# Patient Record
Sex: MALE | Race: WHITE
[De-identification: names, ages, dates, MRNs, and addresses within clinical notes are randomized per-mention and may not be internally consistent; named-entity substitution may affect disease eponyms.]

---

## 2017-01-27 ENCOUNTER — HOSPITAL ENCOUNTER (EMERGENCY)
Dept: HOSPITAL 74 - JERFT | Age: 47
Discharge: HOME | End: 2017-01-27
Payer: COMMERCIAL

## 2017-01-27 VITALS — SYSTOLIC BLOOD PRESSURE: 147 MMHG | HEART RATE: 87 BPM | DIASTOLIC BLOOD PRESSURE: 90 MMHG | TEMPERATURE: 97.9 F

## 2017-01-27 VITALS — BODY MASS INDEX: 24.5 KG/M2

## 2017-01-27 DIAGNOSIS — M54.41: Primary | ICD-10-CM

## 2017-01-27 DIAGNOSIS — Z72.0: ICD-10-CM

## 2017-01-27 PROCEDURE — 3E0233Z INTRODUCTION OF ANTI-INFLAMMATORY INTO MUSCLE, PERCUTANEOUS APPROACH: ICD-10-PCS | Performed by: NURSE PRACTITIONER

## 2017-01-27 NOTE — PDOC
History of Present Illness





- General


Chief Complaint: Pain


Stated Complaint: Rt leg pain


Time Seen by Provider: 01/27/17 16:39


History Source: Patient


Exam Limitations: No Limitations





- History of Present Illness


Initial Comments: 





01/27/17 17:24


47 y/o male presents with continual low back pain but is relieved with Flexeril 

and Percocet but now has complaints of right buttock pain that is shocklike 

down his leg causing tingling intermittently for the past 2 days. Patient 

states works as a  that is frequently lifting and pulling items 

which is  attributed the symptoms to. Patient states did see his PCP a week 

ago who gave him Percocet and Flexeril with since pain continued and now has 

gone to his right leg he decided come to the ER. Patient denies previous injury 

to the affected area, patient denies saddle anesthesia, incontinence, weakness 

of the lower extremity, skin discoloration, or sensory changes.


Occurred: reports: other (3 weeks)


Severity: reports: moderate


Pain Location: reports: back, lower extremity (right leg)


Method of Injury: Yes: other (lifting and pulling)


Associated Symptoms (Fall): denies symptoms





Past History





- Past Medical History


Allergies/Adverse Reactions: 


 Allergies











Allergy/AdvReac Type Severity Reaction Status Date / Time


 


No Known Allergies Allergy   Verified 01/27/17 16:07











Home Medications: 


Ambulatory Orders





Cyclobenzaprine HCl [Flexeril -] 10 mg PO TID 01/27/17 


Oxycodone HCl/Acetaminophen [Percocet 5-325 mg Tablet] 1 tab PO Q4H 01/27/17 








Suicide Attempt (Hx): No





- Psycho/Social/Smoking Cessation Hx


Anxiety: No


Suicidal Ideation: No


Smoking History: Never smoked


Number of Cigarettes Smoked Daily: 2


Information on smoking cessation initiated: No


Hx Alcohol Use: No


Drug/Substance Use Hx: No


Substance Use Type: None


Patient Lives Alone: No


Lives with/in: spouse/SO





**Review of Systems





- Review of Systems


Able to Perform ROS?: Yes


Constitutional: No: Symptoms Reported


HEENTM: No: Symptoms Reported


Respiratory: No: Symptoms reported


Cardiac (ROS): No: Symptoms Reported


ABD/GI: No: Symptoms Reported


: No: Symptoms Reported


Musculoskeletal: Yes: Back Pain (lumbar), Muscle Pain (right buttock)


Integumentary: No: Symptoms Reported


Neurological: No: Symptoms reported


Endocrine: No: Symptoms Reported


Hematologic/Lymphatic: No: Symptoms Reported





*Physical Exam





- Vital Signs


 Last Vital Signs











Temp Pulse Resp BP Pulse Ox


 


 97.9 F   87   18   147/90   99 


 


 01/27/17 16:05  01/27/17 16:05  01/27/17 16:05  01/27/17 16:05  01/27/17 16:05














- Physical Exam


General Appearance: Yes: Nourished, Appropriately Dressed.  No: Apparent 

Distress


HEENT: negative: Pale Conjunctivae


Neck: positive: Supple.  negative: Tender


Respiratory/Chest: positive: Lungs Clear, Normal Breath Sounds.  negative: 

Chest Tender, Respiratory Distress, Accessory Muscle Use


Musculoskeletal: positive: Vertebral Tenderness (l4-L5 bilateral paraspinous 

muscles no midline tenderness), Other (right sciatic tenderness)


Extremity: positive: Normal Capillary Refill, Normal Inspection, Normal Range 

of Motion.  negative: Tender


Integumentary: positive: Normal Color, Warm, Moist


Neurologic: positive: Motor Strength 5/5 (ambulatory)





ED Treatment Course





- RADIOLOGY


Radiology Studies Ordered: 














 Category Date Time Status


 


 SPINE-LUMBAR ONLY [RAD] Stat Radiology  01/27/17 16:39 Taken














- Medications


Given in the ED: 


ED Medications














Discontinued Medications














Generic Name Dose Route Start Last Admin





  Trade Name Freq  PRN Reason Stop Dose Admin


 


Ketorolac Tromethamine  60 mg 01/27/17 16:39 01/27/17 16:51





  Toradol Injection -  IM 01/27/17 16:40  60 mg





  ONCE ONE   Administration














Medical Decision Making





- Medical Decision Making


01/27/17 17:01


Pt with continual low back pain and right buttock pain. Patient was given 

Flexeril and Percocet by his private care doctor which she states does 

alleviate the back pain but the sciatic pain continues. Patient states has had 

no imaging and states the pain is worsened with sitting. Patient on exam had no 

central tenderness to the vertebral column patient was ordered for lumbar x-ray 

and a injection of Toradol








01/27/17 17:24


xray shows mild degenerative arthritis.


States feeling better after Toradol. 


Patient will be given referral to orthopedist. 





*DC/Admit/Observation/Transfer


Diagnosis at time of Disposition: 


 Lumbar pain with radiation down right leg





- Discharge Dispostion


Disposition: HOME


Condition at time of disposition: Good





- Referrals


Referrals: 


Bill Biswas [Primary Care Provider] - 


Edmond Hrady MD [Staff Physician] - 





- Patient Instructions


Printed Discharge Instructions:  DI for Low Back Pain


Additional Instructions: 


Please take medication as previously prescribed.


Rest.


Avoid movments that trigger discomfort.


Xray shows arthritis of the spine.


You try taking Motrin for pain instead of the percoet.


Follow up with referred physician.

## 2021-07-08 ENCOUNTER — HOSPITAL ENCOUNTER (EMERGENCY)
Dept: HOSPITAL 74 - JERFT | Age: 51
Discharge: HOME | End: 2021-07-08
Payer: COMMERCIAL

## 2021-07-08 VITALS — DIASTOLIC BLOOD PRESSURE: 90 MMHG | TEMPERATURE: 98.1 F | HEART RATE: 87 BPM | SYSTOLIC BLOOD PRESSURE: 138 MMHG

## 2021-07-08 VITALS — BODY MASS INDEX: 26.4 KG/M2

## 2021-07-08 DIAGNOSIS — S05.02XA: Primary | ICD-10-CM

## 2023-04-22 ENCOUNTER — HOSPITAL ENCOUNTER (EMERGENCY)
Dept: HOSPITAL 74 - JER | Age: 53
LOS: 1 days | Discharge: HOME | End: 2023-04-23
Payer: COMMERCIAL

## 2023-04-22 VITALS — BODY MASS INDEX: 24.3 KG/M2

## 2023-04-22 DIAGNOSIS — M75.01: Primary | ICD-10-CM

## 2023-04-22 DIAGNOSIS — M25.511: ICD-10-CM

## 2023-04-23 VITALS
TEMPERATURE: 98 F | SYSTOLIC BLOOD PRESSURE: 147 MMHG | DIASTOLIC BLOOD PRESSURE: 88 MMHG | HEART RATE: 68 BPM | RESPIRATION RATE: 18 BRPM

## 2024-12-06 ENCOUNTER — OFFICE (OUTPATIENT)
Facility: LOCATION | Age: 54
Setting detail: OPHTHALMOLOGY
End: 2024-12-06
Payer: MEDICAID

## 2024-12-06 DIAGNOSIS — H11.001: ICD-10-CM

## 2024-12-06 DIAGNOSIS — H52.4: ICD-10-CM

## 2024-12-06 PROCEDURE — 92015 DETERMINE REFRACTIVE STATE: CPT | Performed by: OPHTHALMOLOGY

## 2024-12-06 PROCEDURE — 92014 COMPRE OPH EXAM EST PT 1/>: CPT | Performed by: OPHTHALMOLOGY

## 2024-12-06 ASSESSMENT — REFRACTION_MANIFEST
OS_SPHERE: -0.75
OD_ADD: +1.75
OD_VA1: 20/20-
OD_SPHERE: -0.75
OS_AXIS: 175
OD_CYLINDER: +0.75
OS_CYLINDER: +0.75
OS_ADD: +1.75
OD_AXIS: 005
OS_VA1: 20/20

## 2024-12-06 ASSESSMENT — REFRACTION_CURRENTRX
OD_OVR_VA: 20/
OS_OVR_VA: 20/

## 2024-12-06 ASSESSMENT — VISUAL ACUITY
OD_BCVA: 20/25-2
OS_BCVA: 20/25

## 2024-12-06 ASSESSMENT — REFRACTION_AUTOREFRACTION
OS_CYLINDER: +0.75
OS_AXIS: 175
OS_SPHERE: -0.75
OD_AXIS: 005
OD_SPHERE: -1.00
OD_CYLINDER: +0.75

## 2024-12-06 ASSESSMENT — CONFRONTATIONAL VISUAL FIELD TEST (CVF)
OS_FINDINGS: FULL
OD_FINDINGS: FULL

## 2024-12-06 ASSESSMENT — TONOMETRY
OS_IOP_MMHG: 16
OD_IOP_MMHG: 20